# Patient Record
Sex: FEMALE | Race: WHITE | NOT HISPANIC OR LATINO | Employment: UNEMPLOYED | URBAN - METROPOLITAN AREA
[De-identification: names, ages, dates, MRNs, and addresses within clinical notes are randomized per-mention and may not be internally consistent; named-entity substitution may affect disease eponyms.]

---

## 2017-01-27 ENCOUNTER — TRANSCRIBE ORDERS (OUTPATIENT)
Dept: CT IMAGING | Facility: HOSPITAL | Age: 37
End: 2017-01-27

## 2017-01-27 ENCOUNTER — HOSPITAL ENCOUNTER (OUTPATIENT)
Dept: CT IMAGING | Facility: HOSPITAL | Age: 37
Discharge: HOME | End: 2017-01-27
Admitting: NURSE PRACTITIONER

## 2017-01-27 DIAGNOSIS — H53.9 VISION CHANGES: Primary | ICD-10-CM

## 2017-01-27 DIAGNOSIS — H53.9 VISION CHANGES: ICD-10-CM

## 2017-01-27 PROCEDURE — 70450 CT HEAD/BRAIN W/O DYE: CPT

## 2017-01-31 ENCOUNTER — TRANSCRIBE ORDERS (OUTPATIENT)
Dept: ADMINISTRATIVE | Facility: HOSPITAL | Age: 37
End: 2017-01-31

## 2017-01-31 DIAGNOSIS — R51.9 ACUTE INTRACTABLE HEADACHE, UNSPECIFIED HEADACHE TYPE: Primary | ICD-10-CM

## 2017-02-06 ENCOUNTER — APPOINTMENT (OUTPATIENT)
Dept: MRI IMAGING | Facility: HOSPITAL | Age: 37
End: 2017-02-06

## 2020-02-28 ENCOUNTER — OFFICE VISIT (OUTPATIENT)
Dept: PULMONOLOGY | Age: 40
End: 2020-02-28
Payer: MEDICAID

## 2020-02-28 VITALS — SYSTOLIC BLOOD PRESSURE: 132 MMHG | HEART RATE: 82 BPM | DIASTOLIC BLOOD PRESSURE: 87 MMHG | OXYGEN SATURATION: 93 %

## 2020-02-28 PROBLEM — Z87.09 HISTORY OF ARDS: Status: ACTIVE | Noted: 2020-02-28

## 2020-02-28 PROBLEM — Z87.01 HISTORY OF PNEUMONIA: Status: ACTIVE | Noted: 2020-02-28

## 2020-02-28 PROCEDURE — G8421 BMI NOT CALCULATED: HCPCS | Performed by: INTERNAL MEDICINE

## 2020-02-28 PROCEDURE — 99214 OFFICE O/P EST MOD 30 MIN: CPT | Performed by: INTERNAL MEDICINE

## 2020-02-28 PROCEDURE — G8427 DOCREV CUR MEDS BY ELIG CLIN: HCPCS | Performed by: INTERNAL MEDICINE

## 2020-02-28 PROCEDURE — 1036F TOBACCO NON-USER: CPT | Performed by: INTERNAL MEDICINE

## 2020-02-28 PROCEDURE — G8484 FLU IMMUNIZE NO ADMIN: HCPCS | Performed by: INTERNAL MEDICINE

## 2020-02-28 RX ORDER — DICLOFENAC SODIUM 75 MG/1
75 TABLET, DELAYED RELEASE ORAL 2 TIMES DAILY
COMMUNITY

## 2020-02-28 RX ORDER — IPRATROPIUM BROMIDE AND ALBUTEROL SULFATE 2.5; .5 MG/3ML; MG/3ML
1 SOLUTION RESPIRATORY (INHALATION) EVERY 4 HOURS
COMMUNITY

## 2020-02-28 RX ORDER — BUDESONIDE 0.5 MG/2ML
1 INHALANT ORAL 2 TIMES DAILY
COMMUNITY

## 2020-02-28 RX ORDER — CARVEDILOL 25 MG/1
25 TABLET ORAL 2 TIMES DAILY WITH MEALS
COMMUNITY

## 2020-02-28 RX ORDER — BUSPIRONE HYDROCHLORIDE 10 MG/1
20 TABLET ORAL 2 TIMES DAILY
COMMUNITY

## 2020-02-28 RX ORDER — LOSARTAN POTASSIUM 50 MG/1
50 TABLET ORAL DAILY
COMMUNITY

## 2020-02-28 ASSESSMENT — ENCOUNTER SYMPTOMS
NAUSEA: 0
ABDOMINAL PAIN: 0
SINUS PRESSURE: 0
CONSTIPATION: 0
DIARRHEA: 0

## 2020-02-28 NOTE — PROGRESS NOTES
[facility]. She recovered from this illness but had a series of other upper respiratory and pneumonias. She was hospitalized again for this. She is taken a couple rounds of antibiotics at home as well for symptoms of cough and sputum production. She brought all of her records with her for us to review. She is concerned about the long-term implication of these illnesses. She does have a history of autoimmune disease. In the past she had been taking Humira but is not taking that presently. At this point she is actually feeling pretty good. Review of Systems  Review of Systems   Constitutional: Negative for fatigue and fever. HENT: Negative for congestion and sinus pressure. Eyes: Negative for visual disturbance. Cardiovascular: Negative for chest pain and palpitations. Gastrointestinal: Negative for abdominal pain, constipation, diarrhea and nausea. Genitourinary: Negative for difficulty urinating. Musculoskeletal: Negative for arthralgias. Skin: Negative for rash. Neurological: Negative for dizziness and light-headedness. Hematological: Does not bruise/bleed easily. Psychiatric/Behavioral: Negative for behavioral problems. History  I have reviewed past medical, surgical, social and family history. This is documented elsewhere in themedical record. Physical Exam:  Physical Exam  Constitutional:       General: She is not in acute distress. Appearance: She is well-developed. HENT:      Head: Normocephalic and atraumatic. Eyes:      Comments: Nasal mucosa, teeth and gums and oropharynx are clear. Neck:      Musculoskeletal: Neck supple. Thyroid: No thyromegaly (There are no other neck masses noted. ). Vascular: No JVD. Trachea: No tracheal deviation. Cardiovascular:      Rate and Rhythm: Normal rate and regular rhythm. Heart sounds: Normal heart sounds. No murmur.    Pulmonary:      Effort: Pulmonary effort is normal. No respiratory distress. Breath sounds: Normal breath sounds. No wheezing or rales. Chest:      Chest wall: No tenderness. Abdominal:      General: Bowel sounds are normal. There is no distension. Palpations: Abdomen is soft. Mass: There is no hepatosplenomegaly. Tenderness: There is no abdominal tenderness. Musculoskeletal:         General: No tenderness (Muscle strength is normal and there is no obvious atrophy). Lymphadenopathy:      Cervical: No cervical adenopathy. Skin:     General: Skin is warm and dry. Findings: No rash. Psychiatric:      Comments: Oriented to person place and time         No Known Allergies  Prior to Visit Medications    Medication Sig Taking? Authorizing Provider   ipratropium-albuterol (DUONEB) 0.5-2.5 (3) MG/3ML SOLN nebulizer solution Inhale 1 vial into the lungs every 4 hours Yes Historical Provider, MD   budesonide (PULMICORT) 0.5 MG/2ML nebulizer suspension Take 1 ampule by nebulization 2 times daily Yes Historical Provider, MD   losartan (COZAAR) 50 MG tablet Take 50 mg by mouth daily Yes Historical Provider, MD   carvedilol (COREG) 25 MG tablet Take 25 mg by mouth 2 times daily (with meals) Yes Historical Provider, MD   busPIRone (BUSPAR) 10 MG tablet Take 20 mg by mouth 2 times daily Yes Historical Provider, MD   diclofenac (VOLTAREN) 75 MG EC tablet Take 75 mg by mouth 2 times daily Yes Historical Provider, MD   ibuprofen (ADVIL;MOTRIN) 200 MG CAPS Take 1 capsule by mouth 3 times daily as needed  Yes Historical Provider, MD   DULoxetine (CYMBALTA) 60 MG capsule Take 1 capsule by mouth daily Yes Sarbjit Maradiaga MD   gabapentin (NEURONTIN) 300 MG capsule Take 1 capsule by mouth 3 times daily  Patient taking differently: Take 600 mg by mouth 3 times daily.   Yes Sarbjit Maradiaga MD   buPROPion (WELLBUTRIN SR) 100 MG SR tablet Take 1 tablet by mouth 2 times daily  Yes Historical Provider, MD   estradiol (ESTRACE) 2 MG tablet Take 2 mg by mouth daily Yes Historical Provider, MD nortriptyline (PAMELOR) 75 MG capsule Take 75 mg by mouth nightly Yes Historical Provider, MD   albuterol (PROVENTIL HFA;VENTOLIN HFA) 108 (90 BASE) MCG/ACT inhaler Inhale 2 puffs into the lungs every 6 hours as needed for Wheezing Yes Historical Provider, MD   omeprazole (PRILOSEC) 20 MG capsule Take 1 capsule by mouth 2 times daily Yes Vanna Bennett MD       Vitals:    02/28/20 1537   BP: 132/87   Pulse: 82   SpO2: 93%     There is no height or weight on file to calculate BMI.      Wt Readings from Last 3 Encounters:   06/21/16 238 lb 6.4 oz (108.1 kg)   12/15/15 245 lb (111.1 kg)   10/28/15 238 lb 9.6 oz (108.2 kg)     BP Readings from Last 3 Encounters:   02/28/20 132/87   06/21/16 120/88   12/15/15 138/88        Social History     Tobacco Use   Smoking Status Never Smoker   Smokeless Tobacco Never Used

## 2020-04-17 ENCOUNTER — TRANSCRIBE ORDERS (OUTPATIENT)
Dept: ADMINISTRATIVE | Facility: HOSPITAL | Age: 40
End: 2020-04-17

## 2020-04-17 DIAGNOSIS — M47.816 LUMBAR SPONDYLOSIS: Primary | ICD-10-CM

## 2020-04-17 DIAGNOSIS — M54.16 LUMBAR RADICULOPATHY: ICD-10-CM

## 2020-04-17 DIAGNOSIS — M54.40 LOW BACK PAIN WITH SCIATICA, SCIATICA LATERALITY UNSPECIFIED, UNSPECIFIED BACK PAIN LATERALITY, UNSPECIFIED CHRONICITY: ICD-10-CM

## 2020-04-17 DIAGNOSIS — M96.1 POSTLAMINECTOMY SYNDROME, LUMBAR REGION: ICD-10-CM

## 2020-04-17 DIAGNOSIS — M96.1 POSTLAMINECTOMY SYNDROME, CERVICAL REGION: ICD-10-CM

## 2020-05-26 ENCOUNTER — APPOINTMENT (OUTPATIENT)
Dept: CT IMAGING | Facility: HOSPITAL | Age: 40
End: 2020-05-26

## 2020-06-15 ENCOUNTER — HOSPITAL ENCOUNTER (OUTPATIENT)
Dept: GENERAL RADIOLOGY | Facility: HOSPITAL | Age: 40
End: 2020-06-15